# Patient Record
Sex: MALE | URBAN - METROPOLITAN AREA
[De-identification: names, ages, dates, MRNs, and addresses within clinical notes are randomized per-mention and may not be internally consistent; named-entity substitution may affect disease eponyms.]

---

## 2020-12-18 ENCOUNTER — APPOINTMENT (RX ONLY)
Dept: URBAN - METROPOLITAN AREA CLINIC 43 | Facility: CLINIC | Age: 20
Setting detail: DERMATOLOGY
End: 2020-12-18

## 2020-12-18 DIAGNOSIS — D22 MELANOCYTIC NEVI: ICD-10-CM

## 2020-12-18 DIAGNOSIS — L70.0 ACNE VULGARIS: ICD-10-CM

## 2020-12-18 DIAGNOSIS — L20.89 OTHER ATOPIC DERMATITIS: ICD-10-CM | Status: WELL CONTROLLED

## 2020-12-18 DIAGNOSIS — L73.1 PSEUDOFOLLICULITIS BARBAE: ICD-10-CM

## 2020-12-18 PROBLEM — D22.5 MELANOCYTIC NEVI OF TRUNK: Status: ACTIVE | Noted: 2020-12-18

## 2020-12-18 PROCEDURE — ? COUNSELING

## 2020-12-18 PROCEDURE — ? RECOMMENDATIONS

## 2020-12-18 PROCEDURE — 99203 OFFICE O/P NEW LOW 30 MIN: CPT

## 2020-12-18 PROCEDURE — ? PRESCRIPTION

## 2020-12-18 RX ADMIN — TRIAMCINOLONE ACETONIDE: 1 CREAM TOPICAL at 00:00

## 2020-12-18 RX ADMIN — CLINDAMYCIN PHOSPHATE: 10 LOTION TOPICAL at 00:00

## 2020-12-18 ASSESSMENT — LOCATION DETAILED DESCRIPTION DERM
LOCATION DETAILED: RIGHT CENTRAL MALAR CHEEK
LOCATION DETAILED: RIGHT LATERAL BUCCAL CHEEK
LOCATION DETAILED: LEFT ANTECUBITAL SKIN
LOCATION DETAILED: RIGHT ANTECUBITAL SKIN
LOCATION DETAILED: RIGHT SUBMANDIBULAR AREA
LOCATION DETAILED: INFERIOR THORACIC SPINE
LOCATION DETAILED: LEFT SUBMANDIBULAR AREA
LOCATION DETAILED: LEFT LATERAL BUCCAL CHEEK
LOCATION DETAILED: RIGHT MEDIAL FOREHEAD
LOCATION DETAILED: LEFT POPLITEAL SKIN
LOCATION DETAILED: LEFT INFERIOR CENTRAL MALAR CHEEK
LOCATION DETAILED: RIGHT PROXIMAL CALF

## 2020-12-18 ASSESSMENT — LOCATION ZONE DERM
LOCATION ZONE: LEG
LOCATION ZONE: TRUNK
LOCATION ZONE: FACE
LOCATION ZONE: ARM

## 2020-12-18 ASSESSMENT — LOCATION SIMPLE DESCRIPTION DERM
LOCATION SIMPLE: UPPER BACK
LOCATION SIMPLE: RIGHT CALF
LOCATION SIMPLE: RIGHT CHEEK
LOCATION SIMPLE: RIGHT FOREHEAD
LOCATION SIMPLE: LEFT SUBMANDIBULAR AREA
LOCATION SIMPLE: LEFT POPLITEAL SKIN
LOCATION SIMPLE: LEFT CHEEK
LOCATION SIMPLE: RIGHT ELBOW
LOCATION SIMPLE: RIGHT SUBMANDIBULAR AREA
LOCATION SIMPLE: LEFT ELBOW

## 2020-12-18 NOTE — PROCEDURE: RECOMMENDATIONS
Recommendation Preamble: The following recommendations were made during the visit:
Detail Level: Zone
Recommendations (Free Text): - Currently doing well with no active areas of AD/eczema. Given that his flares are typically limited to focal areas on the upper and lower extremities, we discussed that he would not be a candidate for Dupixent yet.\\n\\nTOPICAL STEROIDS:\\n- For flares, TAC 0.1% cream BID to affected areas (trunk/extremities) x 2 wks, then MWF x 2 wks, repeating cycle PRN\\n- May occlude areas overnight with Saran wrap for 1 week\\n\\nMOISTURIZERS:\\n- Recommend liberal use OTC moisturizers (e.g. Vanicream, Cetaphil, or Cerave) 2-3 times daily\\n- Recommend using hypoallergenic laundry products and soap (e.g. All Free & Clear, Tide Free & Gentle, Dove fragrance-free soap)\\n\\nANTIHISTAMINES FOR ITCHING:\\n- OTC cetirizine 10 mg OR fexofenadine 180 mg BID\\n- Patients may find one more effective than the other, thus it is worth trialing one agent for a few weeks before switching\\n\\nIf not sufficiently controlled with topical steroids, we discussed potential addition of [id number] to his treatment regimen.
Recommendations (Free Text): - Patient reports he was treated last year inconsistently with Accutane but did not complete a full course.\\n- He has mild-moderate acne today but prefers to start with topical medications, which is reasonable.\\n\\n- OTC Differin 0.1% gel 2 times a week to the entire face, gradually increasing frequency as tolerated up to every night\\n- OTC benzoyl peroxide 2% or 3.5% wash every morning\\n- Clindamycin 1% lotion twice daily to affected areas\\n- Wash with gentle OTC facial cleanser (e.g. Cetaphil or Cerave) at night\\n\\nIf flaring, we discussed potential short term treatment with doxycycline or may reconsider isotretinoin.
Recommendations (Free Text): - OTC benzoyl peroxide 2% or 3.5% wash every morning\\n- Clindamycin 1% lotion twice daily to affected areas
Detail Level: Simple

## 2020-12-19 RX ORDER — TRIAMCINOLONE ACETONIDE 1 MG/G
CREAM TOPICAL
Qty: 1 | Refills: 5 | Status: ERX | COMMUNITY
Start: 2020-12-18

## 2020-12-19 RX ORDER — CLINDAMYCIN PHOSPHATE 10 MG/ML
LOTION TOPICAL
Qty: 1 | Refills: 5 | Status: ERX | COMMUNITY
Start: 2020-12-18